# Patient Record
Sex: MALE | Race: WHITE | Employment: FULL TIME | URBAN - METROPOLITAN AREA
[De-identification: names, ages, dates, MRNs, and addresses within clinical notes are randomized per-mention and may not be internally consistent; named-entity substitution may affect disease eponyms.]

---

## 2020-03-02 ENCOUNTER — HOSPITAL ENCOUNTER (EMERGENCY)
Age: 50
Discharge: HOME | End: 2020-03-02

## 2020-03-02 VITALS
SYSTOLIC BLOOD PRESSURE: 108 MMHG | OXYGEN SATURATION: 97 % | DIASTOLIC BLOOD PRESSURE: 68 MMHG | HEIGHT: 69 IN | HEART RATE: 61 BPM | TEMPERATURE: 98.3 F | BODY MASS INDEX: 29.62 KG/M2 | WEIGHT: 200 LBS | RESPIRATION RATE: 18 BRPM

## 2020-03-02 LAB
BILIRUBIN URINE: NEGATIVE
BLOOD, URINE: NEGATIVE
CLARITY: CLEAR
COLOR: YELLOW
GLUCOSE URINE: NEGATIVE MG/DL
KETONES, URINE: NEGATIVE MG/DL
LEUKOCYTE ESTERASE, URINE: NEGATIVE
MICROSCOPIC EXAMINATION: NORMAL
NITRITE, URINE: NEGATIVE
PH UA: 5.5 (ref 5–8)
PROTEIN UA: NEGATIVE MG/DL
SPECIFIC GRAVITY UA: <=1.005 (ref 1–1.03)
URINE REFLEX TO CULTURE: NORMAL
URINE TYPE: NORMAL
UROBILINOGEN, URINE: 0.2 E.U./DL

## 2020-03-02 PROCEDURE — 96372 THER/PROPH/DIAG INJ SC/IM: CPT

## 2020-03-02 PROCEDURE — 6360000002 HC RX W HCPCS: Performed by: NURSE PRACTITIONER

## 2020-03-02 PROCEDURE — 99283 EMERGENCY DEPT VISIT LOW MDM: CPT

## 2020-03-02 PROCEDURE — 81003 URINALYSIS AUTO W/O SCOPE: CPT

## 2020-03-02 RX ORDER — KETOROLAC TROMETHAMINE 30 MG/ML
30 INJECTION, SOLUTION INTRAMUSCULAR; INTRAVENOUS ONCE
Status: COMPLETED | OUTPATIENT
Start: 2020-03-02 | End: 2020-03-02

## 2020-03-02 RX ORDER — ORPHENADRINE CITRATE 30 MG/ML
60 INJECTION INTRAMUSCULAR; INTRAVENOUS ONCE
Status: COMPLETED | OUTPATIENT
Start: 2020-03-02 | End: 2020-03-02

## 2020-03-02 RX ORDER — NAPROXEN 500 MG/1
500 TABLET ORAL 2 TIMES DAILY WITH MEALS
Qty: 30 TABLET | Refills: 0 | Status: SHIPPED | OUTPATIENT
Start: 2020-03-02 | End: 2021-09-12 | Stop reason: ALTCHOICE

## 2020-03-02 RX ORDER — PREDNISONE 10 MG/1
60 TABLET ORAL DAILY
Qty: 30 TABLET | Refills: 0 | Status: SHIPPED | OUTPATIENT
Start: 2020-03-02 | End: 2020-03-07

## 2020-03-02 RX ORDER — METHOCARBAMOL 500 MG/1
500 TABLET, FILM COATED ORAL 3 TIMES DAILY
Qty: 30 TABLET | Refills: 0 | Status: SHIPPED | OUTPATIENT
Start: 2020-03-02 | End: 2020-03-12

## 2020-03-02 RX ADMIN — KETOROLAC TROMETHAMINE 30 MG: 30 INJECTION, SOLUTION INTRAMUSCULAR at 15:04

## 2020-03-02 RX ADMIN — ORPHENADRINE CITRATE 60 MG: 30 INJECTION INTRAMUSCULAR; INTRAVENOUS at 15:04

## 2020-03-02 ASSESSMENT — ENCOUNTER SYMPTOMS
DIARRHEA: 0
COUGH: 0
SHORTNESS OF BREATH: 0
COLOR CHANGE: 0
VOMITING: 0
WHEEZING: 0
BACK PAIN: 1
NAUSEA: 0
ABDOMINAL PAIN: 0

## 2020-03-02 ASSESSMENT — PAIN DESCRIPTION - LOCATION
LOCATION: BACK
LOCATION: BACK

## 2020-03-02 ASSESSMENT — PAIN SCALES - GENERAL
PAINLEVEL_OUTOF10: 10
PAINLEVEL_OUTOF10: 7
PAINLEVEL_OUTOF10: 10

## 2020-03-02 ASSESSMENT — PAIN DESCRIPTION - PAIN TYPE
TYPE: ACUTE PAIN
TYPE: ACUTE PAIN

## 2020-03-02 NOTE — LETTER
[facility]  [address] [facility] [address]  Phone: [phone]  Fax: [phone]             March 2, 2020    Patient: Josem Mcardle   YOB: 1970   Date of Visit: 3/2/2020       To Whom It May Concern:    Josem Mcardle was seen and treated in our emergency department on 3/2/2020. He may return to work on 3/12/2020.       Sincerely,             Signature:__________________________________

## 2020-03-02 NOTE — ED PROVIDER NOTES
[facility]  EMERGENCY [facility]PARTMENT ENCOUNTER      Pt Name: Allegra Alvarez  MDM:[id number]  [place] 1970  Date of evaluation: 3/2/2020  Provider: SUDHEER North CNP      Chief Complaint:    Chief Complaint   Patient presents with    Back Pain     bent over to put on work boots and hurt back       Nursing Notes, Past Medical Hx, Past Surgical Hx, Social Hx, Allergies, and Family Hx were all reviewed and agreed with or any disagreements were addressed in the HPI.    HPI:  (Location, Duration, Timing, Severity, Quality, Assoc Sx, Context, Modifying factors)  This is a  54 y.o. male who presents emergency department with left lower back pain, patient states that he bent over to put his work boots on when he had sudden spasming in his back, states he is a  and has a history of back pain. However he denies any new numbness tingling or paresthesias. No falls traumas or injuries. Rates the pain a 10 out of 10, states any movement worsens the pain. He denies any loss of bowel or bladder control, no urinary tension, no recent cough, congestion, fever or chills. Has not take any medicine for the pain. Therefore, no additional complaints, no additional aggravating relieving factors. The patient presents awake, alert and in no acute respiratory distress or toxic appearance. PastMedical/Surgical History:  History reviewed. No pertinent past medical history. Procedure Laterality Date    ADENOIDECTOMY      TONSILLECTOMY         Medications:  Previous Medications    No medications on file         Review of Systems:  Review of Systems   Constitutional: Negative for chills and fever. HENT: Negative for congestion. Respiratory: Negative for cough, shortness of breath and wheezing. Cardiovascular: Negative for chest pain. Gastrointestinal: Negative for abdominal pain, diarrhea, nausea and vomiting.    Genitourinary: Negative for difficulty urinating, dysuria and frequency. Musculoskeletal: Positive for back pain and myalgias. Patient complains of left lower back pain, patient states that he bent over to put his work boots on when he had sudden spasming in his back, states he is a  and has a history of back pain. However he denies any new numbness tingling or paresthesias. No falls traumas or injuries. Skin: Negative for color change. Neurological: Negative for weakness, numbness and headaches. Positives and Pertinent negatives as per HPI. Except as noted above in the ROS, problem specific ROS was completed and is negative. Physical Exam:  Physical Exam  Vitals signs and nursing note reviewed. Constitutional:       Appearance: He is well-developed. He is not diaphoretic. HENT:      Head: Normocephalic. Right Ear: External ear normal.      Left Ear: External ear normal.   Eyes:      General: No scleral icterus. Right eye: No discharge. Left eye: No discharge. Neck:      Musculoskeletal: Normal range of motion and neck supple. Cardiovascular:      Rate and Rhythm: Normal rate and regular rhythm. Pulmonary:      Effort: Pulmonary effort is normal. No respiratory distress. Breath sounds: Normal breath sounds. Abdominal:      General: Bowel sounds are normal.      Palpations: Abdomen is soft. Musculoskeletal: Normal range of motion. Comments: Reproducible tenderness to the left side of the paraspinal muscles, no CVA tenderness. No central cervical thoracic or lumbar spine tenderness or step-off. Unremarkable neurological exam with no acute focal deficits. Skin:     General: Skin is warm. Coloration: Skin is not pale. Neurological:      General: No focal deficit present. Mental Status: He is alert and oriented to person, place, and time. GCS: GCS eye subscore is 4. GCS verbal subscore is 5. GCS motor subscore is 6.       Cranial Nerves: Take 1 tablet by mouth 3 times daily for 10 days    NAPROXEN (NAPROSYN) 500 MG TABLET    Take 1 tablet by mouth 2 times daily (with meals)    PREDNISONE (DELTASONE) 10 MG TABLET    Take 6 tablets by mouth daily for 5 doses       DISCONTINUED MEDICATIONS:  Discontinued Medications    ONDANSETRON (ZOFRAN ODT) 4 MG DISINTEGRATING TABLET    Take 1 tablet by mouth every 8 hours as needed for Nausea for 10 doses.               (Please note the MDM and HPI sections of this note were completed with a voice recognition program.  Efforts were made to edit the dictations but occasionally words are mis-transcribed.)    Electronically signed, SUDHEER Le CNP,           SUDHEER Le CNP  03/02/20 [id number]

## 2020-03-02 NOTE — ED NOTES
--Patient provided with discharge instructions and any prescriptions. --Instructions, dosing, and follow-up appointments reviewed with patient/family. No further questions or needs at this time. --Vital signs and patient stable upon discharge. --Patient ambulatory to lobby with wife.      Xiao Nguyen RN  03/02/20 [id number]

## 2021-09-12 ENCOUNTER — APPOINTMENT (OUTPATIENT)
Dept: GENERAL RADIOLOGY | Age: 51
End: 2021-09-12
Payer: COMMERCIAL

## 2021-09-12 ENCOUNTER — HOSPITAL ENCOUNTER (EMERGENCY)
Age: 51
Discharge: HOME | End: 2021-09-12
Attending: EMERGENCY MEDICINE
Payer: COMMERCIAL

## 2021-09-12 VITALS
HEIGHT: 70 IN | HEART RATE: 64 BPM | SYSTOLIC BLOOD PRESSURE: 133 MMHG | RESPIRATION RATE: 16 BRPM | BODY MASS INDEX: 29.49 KG/M2 | OXYGEN SATURATION: 98 % | DIASTOLIC BLOOD PRESSURE: 75 MMHG | WEIGHT: 206 LBS | TEMPERATURE: 98.2 F

## 2021-09-12 DIAGNOSIS — M79.604 RIGHT LEG PAIN: Primary | ICD-10-CM

## 2021-09-12 PROCEDURE — 73590 X-RAY EXAM OF LOWER LEG: CPT

## 2021-09-12 PROCEDURE — 99284 EMERGENCY DEPT VISIT MOD MDM: CPT

## 2021-09-12 ASSESSMENT — PAIN DESCRIPTION - LOCATION: LOCATION: LEG

## 2021-09-12 ASSESSMENT — PAIN SCALES - GENERAL
PAINLEVEL_OUTOF10: 5
PAINLEVEL_OUTOF10: 10

## 2021-09-12 ASSESSMENT — PAIN DESCRIPTION - ORIENTATION: ORIENTATION: RIGHT

## 2021-09-12 NOTE — ED PROVIDER NOTES
[address] [facility]  ED PROVIDER NOTE    Patient Identification  Pt Name: Duane Islam  MRN: [id number]  [place] 1970  Date of evaluation: 9/12/2021  Provider: Hai Garcia MD  PCP: No primary care provider on file. Chief Complaint  Leg Pain (was getting into his truck and felt a pop in his right calf; able to bend his leg but hurts to put pressure/walk on it)      HPI  History provided by patient   This is a 54 y.o. male who presents to the ED for right calf pain. Occurred while he was getting into his truck. Linette Kuyelitzamaul a pop. No pain when his leg is held still. There is pain when he is trying to plantarflex with his right foot. No nausea or vomiting. Denies trauma. Denies ankle pain or knee pain. No pain in the hips. ROS  10 systems reviewed, pertinent positives/negatives per HPI otherwise noted to be negative. I have reviewed the following nursing documentation:  Allergies: Patient has no known allergies. Past medical history: History reviewed. No pertinent past medical history. Past surgical history:   Past Surgical History:   Procedure Laterality Date    ADENOIDECTOMY      TONSILLECTOMY         Home medications:   Previous Medications    No medications on file       Social history:  reports that he has been smoking. He has never used smokeless tobacco. He reports current alcohol use of about 8.0 standard drinks of alcohol per week. Family history:  History reviewed. No pertinent family history. Exam  ED Triage Vitals [09/12/21 0227]   BP Temp Temp src Pulse Resp SpO2 Height Weight   133/75 98.2 °F (36.8 °C) -- 64 16 98 % 5' 9.5\" (1.765 m) 206 lb (93.4 kg)     Nursing note and vitals reviewed. Constitutional: In no acute distress  HENT:      Head: Normocephalic      Ears: External ears normal.      Nose: Nose normal.     Mouth: Membrane mucosa moist   Eyes: No discharge. Neck: Supple. Trachea midline. Cardiovascular: Regular rate.  Warm extremities  Pulmonary/Chest: daily (with meals)       This chart was generated using the [address] [place] [address] dictation system. I created this record but it may contain dictation errors given the limitations of this technology.         Mohit Hopkins MD  09/12/21 [id number]

## 2021-09-14 ENCOUNTER — OFFICE VISIT (OUTPATIENT)
Dept: ORTHOPEDIC SURGERY | Age: 51
End: 2021-09-14
Payer: COMMERCIAL

## 2021-09-14 VITALS — HEIGHT: 70 IN | WEIGHT: 210 LBS | RESPIRATION RATE: 18 BRPM | BODY MASS INDEX: 30.06 KG/M2

## 2021-09-14 DIAGNOSIS — F17.200 CURRENT SMOKER: ICD-10-CM

## 2021-09-14 DIAGNOSIS — S86.111A RUPTURE OF RIGHT GASTROCNEMIUS TENDON, INITIAL ENCOUNTER: Primary | ICD-10-CM

## 2021-09-14 PROCEDURE — 99203 OFFICE O/P NEW LOW 30 MIN: CPT | Performed by: ORTHOPAEDIC SURGERY

## 2021-09-14 PROCEDURE — MISCD282 ADJUSTA LIFT: Performed by: ORTHOPAEDIC SURGERY

## 2021-09-14 PROCEDURE — 99406 BEHAV CHNG SMOKING 3-10 MIN: CPT | Performed by: ORTHOPAEDIC SURGERY

## 2021-09-14 RX ORDER — NAPROXEN 500 MG/1
500 TABLET ORAL 2 TIMES DAILY WITH MEALS
Qty: 60 TABLET | Refills: 0 | Status: SHIPPED | OUTPATIENT
Start: 2021-09-14 | End: 2021-10-14

## 2021-09-14 NOTE — LETTER
[address] [place] [address] [address] [facility] [address]  Phone: [phone]  Fax: [phone] Bradly Zurita MD        September 14, 2021     Patient: Keri cAeves   YOB: 1970   Date of Visit: 9/14/2021       To Whom It May Concern: It is my medical opinion that Keri Aceves may return to light duty immediately with the following restrictions: no steps for 4 weeks. If you have any questions or concerns, please don't hesitate to call.     Sincerely,          Marc Win MD

## 2021-09-26 PROBLEM — S86.111A RUPTURE OF RIGHT GASTROCNEMIUS TENDON: Status: ACTIVE | Noted: 2021-09-26

## 2021-09-26 PROBLEM — F17.200 CURRENT SMOKER: Status: ACTIVE | Noted: 2021-09-26

## 2021-09-26 NOTE — PROGRESS NOTES
CHIEF COMPLAINT: Right posterior calf pain / gastrocnemius partial tear    DATE OF INJURY: 9/12/2021    HISTORY:  Mr. Hernan Martinez 54 y.o.  male presents today for the first visit for evaluation of a right leg injury which occurred when he was coming off his large truck and felt a calf sharp pain. He was first seen and evaluated in [facility] , where he was evaluated, splinted and asked to f/u with Orthopedics. He is complaining of posterior calf pain and swelling. This is better with elevation and worse with bearing any wt. The pain is sharp and not radiating. No other complaint. No past medical history on file. Past Surgical History:   Procedure Laterality Date    ADENOIDECTOMY      TONSILLECTOMY         Social History     Socioeconomic History    Marital status:      Spouse name: Not on file    Number of children: Not on file    Years of education: Not on file    Highest education level: Not on file   Occupational History    Not on file   Tobacco Use    Smoking status: Current Every Day Smoker    Smokeless tobacco: Never Used   Substance and Sexual Activity    Alcohol use: Yes     Alcohol/week: 8.0 standard drinks     Types: 8 Cans of beer per week    Drug use: Not on file    Sexual activity: Not on file   Other Topics Concern    Not on file   Social History Narrative    Not on file     Social Determinants of Health     Financial Resource Strain:     Difficulty of Paying Living Expenses:    Food Insecurity:     Worried About Running Out of Food in the Last Year:     [address] in the Last Year:    Transportation Needs:     Lack of Transportation (Medical):      Lack of Transportation (Non-Medical):    Physical Activity:     Days of Exercise per Week:     Minutes of Exercise per Session:    Stress:     Feeling of Stress :    Social Connections:     Frequency of Communication with Friends and Family:     Frequency of Social Gatherings with Friends and Family:     Attends Orthodox Services:     Active Member of Clubs or Organizations:     Attends Club or Organization Meetings:     Marital Status:    Intimate Partner Violence:     Fear of Current or Ex-Partner:     Emotionally Abused:     Physically Abused:     Sexually Abused:        No family history on file. No current outpatient medications on file prior to visit. No current facility-administered medications on file prior to visit. Pertinent items are noted in HPI  Review of systems reviewed from Patient History Form dated on 9/14/2021 and available in the patient's chart under the Media tab. No change noted. PHYSICAL EXAMINATION:  Mr. Hans Tolbert is a very pleasant 54 y.o.  male who presents today in no acute distress, awake, alert, and oriented. He is well dressed, nourished and  groomed. Patient with normal affect. Height is  5' 9.5\" (1.765 m), weight is 210 lb (95.3 kg), Body mass index is 30.57 kg/m². Resting respiratory rate is 16. Examination of the gait, showed that the patient walks with a limp, WB right leg. Examination of both ankles showing a decreased range of motion of the right ankle compare to the other side. There is moderate swelling that can be seen, no ecchymosis calf area. He has intact sensation and good pedal pulses. He has tenderness on deep palpation over the right calf, negative Marcos test for Achillis rupture. IMAGING:  Xrays, 2 views of the right Tib-fib dated 9/12/2-21 from [facility],  were reviewed, and showed no acute fracture. Ankle mortise in anatomic position. IMPRESSION: Right posterior calf pain / gastrocnemius partial tear      PLAN:  I discussed with the  patient, all treatment options including both surgical and non-surgical treatment, and that my recommendation would be avoiding heavy impact activities, and we also recommended stretching exercises of the calf after pain improves which was taught to the patient in the office today. WBAT with heel lift. Naprosyn Rx sent. F/U in 4 weeks and we may consider PT if needed. The patient smokes, and we discussed with the patient the risks of smoking on general health and also on bone and soft tissue healing (delay and non-union), and promised to cut down or stop smoking. Smoking: Educated the patient regarding the hazards of smoking and that it harms their body in many ways. It increases the chance of developing heart disease, lung disease, cancer, and other health problems including poor bone and wound healing. The importance of smoking cessation for optimal bone and wound healing was stressed. This was communicated verbally, 5 Minutes. Procedures    Braxton and Gloria Poinsett Lift $10     Patient was supplied an [employer]er [address] [employer]. This retail item was supplied to provide functional support and assist in protecting the affected area. Verbal and written instructions for the use of and application of this item were provided. The patient was educated and fit by a healthcare professional with expert knowledge and specialization in brace application. They were instructed to contact the office immediately should the equipment result in increased pain, decreased sensation, increased swelling or worsening of the condition.        Gopal Hein MD

## 2021-10-05 ENCOUNTER — OFFICE VISIT (OUTPATIENT)
Dept: ORTHOPEDIC SURGERY | Age: 51
End: 2021-10-05
Payer: COMMERCIAL

## 2021-10-05 VITALS — HEIGHT: 69 IN | BODY MASS INDEX: 31.1 KG/M2 | WEIGHT: 210 LBS

## 2021-10-05 DIAGNOSIS — S86.111A RUPTURE OF RIGHT GASTROCNEMIUS TENDON, INITIAL ENCOUNTER: Primary | ICD-10-CM

## 2021-10-05 PROCEDURE — 99213 OFFICE O/P EST LOW 20 MIN: CPT | Performed by: NURSE PRACTITIONER

## 2021-10-05 NOTE — PROGRESS NOTES
CHIEF COMPLAINT: Right posterior calf pain / gastrocnemius partial tear    DATE OF INJURY: 9/12/2021    HISTORY:  Mr. Hylton Erm 54 y.o.  male presents today for follow up visit for evaluation of a right leg injury which occurred when he was coming off his large truck and felt a calf sharp pain. He was first seen and evaluated in [facility] , where he was evaluated, splinted and asked to f/u with Orthopedics. He states his posterior calf pain and swelling has resolved and is much improved. Denies any pain today and rates a 0/10 VAS. He initially wore his boot and had a heel lift and tried resting with good improvement. No other complaint. 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 History reviewed. No pertinent past medical history. Past Surgical History:   Procedure Laterality Date    ADENOIDECTOMY      TONSILLECTOMY       History reviewed. No pertinent family history. Social History     Socioeconomic History    Marital status:      Spouse name: Not on file    Number of children: Not on file    Years of education: Not on file    Highest education level: Not on file   Occupational History    Not on file   Tobacco Use    Smoking status: Current Every Day Smoker     Packs/day: 0.25    Smokeless tobacco: Current User     Types: Snuff   Vaping Use    Vaping Use: Never used   Substance and Sexual Activity    Alcohol use: Yes     Alcohol/week: 8.0 standard drinks     Types: 8 Cans of beer per week    Drug use: Not on file    Sexual activity: Not on file   Other Topics Concern    Not on file   Social History Narrative    Not on file     Social Determinants of Health     Financial Resource Strain:     Difficulty of Paying Living Expenses:    Food Insecurity:     Worried About Running Out of Food in the Last Year:     [address] in the Last Year:    Transportation Needs:     Lack of Transportation (Medical):      Lack of Transportation (Non-Medical): Physical Activity:     Days of Exercise per Week:     Minutes of Exercise per Session:    Stress:     Feeling of Stress :    Social Connections:     Frequency of Communication with Friends and Family:     Frequency of Social Gatherings with Friends and Family:     Attends Synagogue Services:     Active Member of Clubs or Organizations:     Attends Club or Organization Meetings:     Marital Status:    Intimate Partner Violence:     Fear of Current or Ex-Partner:     Emotionally Abused:     Physically Abused:     Sexually Abused:      Current Outpatient Medications   Medication Sig Dispense Refill    naproxen (NAPROSYN) 500 MG tablet Take 1 tablet by mouth 2 times daily (with meals) (Patient not taking: Reported on 10/5/2021) 60 tablet 0     No current facility-administered medications for this visit. Pertinent items are noted in HPI  Review of systems reviewed from Patient History Form dated on 9/14/2021 and available in the patient's chart under the Media tab. No change noted. PHYSICAL EXAMINATION:  Mr. Warner Green is a very pleasant 54 y.o.  male who presents today in no acute distress, awake, alert, and oriented. He is well dressed, nourished and  groomed. Patient with normal affect. Height is  5' 9\" (1.753 m), weight is 210 lb (95.3 kg), Body mass index is 31.01 kg/m². Resting respiratory rate is 16. Examination of the gait, showed that the patient walks with no limp, WB right leg. Examination of both ankles showing a full range of motion of the right ankle compare to the other side. There is no swelling that can be seen, no ecchymosis calf area. He has intact sensation and good pedal pulses. He has no tenderness on deep palpation over the right calf, negative Marcos test for Achillis rupture. IMAGING:  Xrays, 2 views of the right Tib-fib dated 9/12/2-21 from [facility],  were reviewed, and showed no acute fracture. Ankle mortise in anatomic position. IMPRESSION: Right posterior calf pain / gastrocnemius partial tear      PLAN:  I discussed with the  patient, the findings and discussed treatment options. He can gradually increase his activity level as the pain allows. He was instructed to continue the stretching exercises of the calf which were demonstrated to him today in office. He can discontinue the boot and the heel lift. Naprosyn as needed. I discussed with him that he would benefit from physical therapy, but he would like to work on this on his own. F/U in 6 weeks or as needed. He can return to work full duty with no restrictions. The patient smokes, and we discussed with the patient the risks of smoking on general health and also on bone and soft tissue healing (delay and non-union), and promised to cut down or stop smoking. Smoking: Educated the patient regarding the hazards of smoking and that it harms their body in many ways. It increases the chance of developing heart disease, lung disease, cancer, and other health problems including poor bone and wound healing. The importance of smoking cessation for optimal bone and wound healing was stressed. This was communicated verbally, 5 Minutes.       SUDHEER Holloway - CNP

## 2021-10-05 NOTE — LETTER
[facility]  [address]  Phone: [phone]  Fax: [phone] [place] SUDHEER Pan - CNP        October 5, 2021     Patient: Shameka Malhotra   YOB: 1970   Date of Visit: 10/5/2021       To Whom It May Concern: It is my medical opinion that Shameka Malhotra may return to full duty immediately with no restrictions. If you have any questions or concerns, please don't hesitate to call.     Sincerely,          SUDHEER Kelly CNP